# Patient Record
Sex: MALE | Race: WHITE | NOT HISPANIC OR LATINO | Employment: OTHER | URBAN - METROPOLITAN AREA
[De-identification: names, ages, dates, MRNs, and addresses within clinical notes are randomized per-mention and may not be internally consistent; named-entity substitution may affect disease eponyms.]

---

## 2022-12-26 ENCOUNTER — HOSPITAL ENCOUNTER (EMERGENCY)
Facility: CLINIC | Age: 37
Discharge: HOME | End: 2022-12-26
Attending: EMERGENCY MEDICINE | Admitting: EMERGENCY MEDICINE
Payer: COMMERCIAL

## 2022-12-26 VITALS
OXYGEN SATURATION: 96 % | SYSTOLIC BLOOD PRESSURE: 133 MMHG | RESPIRATION RATE: 18 BRPM | BODY MASS INDEX: 25.84 KG/M2 | WEIGHT: 195 LBS | HEIGHT: 73 IN | HEART RATE: 73 BPM | TEMPERATURE: 97.9 F | DIASTOLIC BLOOD PRESSURE: 88 MMHG

## 2022-12-26 DIAGNOSIS — S61.211A LACERATION OF LEFT INDEX FINGER WITHOUT FOREIGN BODY WITHOUT DAMAGE TO NAIL, INITIAL ENCOUNTER: ICD-10-CM

## 2022-12-26 PROCEDURE — 250N000013 HC RX MED GY IP 250 OP 250 PS 637: Performed by: EMERGENCY MEDICINE

## 2022-12-26 PROCEDURE — 99283 EMERGENCY DEPT VISIT LOW MDM: CPT

## 2022-12-26 PROCEDURE — 12001 RPR S/N/AX/GEN/TRNK 2.5CM/<: CPT

## 2022-12-26 RX ORDER — CEPHALEXIN 500 MG/1
1000 CAPSULE ORAL ONCE
Status: COMPLETED | OUTPATIENT
Start: 2022-12-26 | End: 2022-12-26

## 2022-12-26 RX ORDER — CEPHALEXIN 500 MG/1
500 CAPSULE ORAL 3 TIMES DAILY
Qty: 21 CAPSULE | Refills: 0 | Status: SHIPPED | OUTPATIENT
Start: 2022-12-26 | End: 2023-01-02

## 2022-12-26 RX ADMIN — CEPHALEXIN 1000 MG: 500 CAPSULE ORAL at 21:43

## 2022-12-26 ASSESSMENT — ENCOUNTER SYMPTOMS: WOUND: 1

## 2022-12-26 ASSESSMENT — ACTIVITIES OF DAILY LIVING (ADL): ADLS_ACUITY_SCORE: 35

## 2022-12-27 NOTE — ED TRIAGE NOTES
Pt presents to ED for laceration to left index finger     Triage Assessment     Row Name 12/26/22 2028       Triage Assessment (Adult)    Airway WDL WDL       Respiratory WDL    Respiratory WDL WDL       Skin Circulation/Temperature WDL    Skin Circulation/Temperature WDL WDL       Cardiac WDL    Cardiac WDL WDL       Peripheral/Neurovascular WDL    Peripheral Neurovascular WDL WDL       Cognitive/Neuro/Behavioral WDL    Cognitive/Neuro/Behavioral WDL WDL

## 2023-09-18 NOTE — TELEPHONE ENCOUNTER
Action 2023 JTV 1:16 PM    Action Taken CSS called patient. Patient did not . CSS was unable to Lvm for patient to return call.      Action October 3, 2023 Jtv 2:47 PM    Action Taken CSS called patient. Patient did not  and CSS was unable wit LVM for return call. CSS sent a message to Nurse with update that max attempt has been met.     Max attempts to contact patient for medical records reached     MEDICAL RECORDS REQUEST   [facility]  Urology Clinic  [address] [place] [address]  PHONE: [phone]  Fax: [phone]        FUTURE VISIT INFORMATION                                                   Isaiah Coley, : 1985 scheduled for future visit at [facility]    APPOINTMENT INFORMATION:  Date: 10/12/2023  Provider:  Joseph Groves MD  Reason for Visit/Diagnosis: INFERTILITY      RECORDS REQUESTED FOR VISIT                                                     NOTES  STATUS/DETAILS   OPERATIVE REPORT  in process   MEDICATION LIST  in process   INFERTILITY     LAST UROLOGY/OB GYN VISIT NOTE  in process   SEMEN ANALYSIS (LAST 2)  in process   ALBUMIN  in process   FSH  in process   LH  in process   SHBG  in process   T  in process     PRE-VISIT CHECKLIST      Joint diagnostic appointment coordinated correctly          (ensure right order & amount of time) Yes   RECORD COLLECTION COMPLETE If no, please explain pending

## 2023-10-04 ENCOUNTER — TELEPHONE (OUTPATIENT)
Dept: UROLOGY | Facility: CLINIC | Age: 38
End: 2023-10-04
Payer: COMMERCIAL

## 2023-10-04 NOTE — TELEPHONE ENCOUNTER
LVM for pt in regards to appt with Dr. Groves on 10/12/2023. We have been unable to reach pt to pull most recent records into his chart. Left clinic call back number for pt to call.      Lakisha Magallon  October 4, 2023  4:17 PM

## 2023-10-12 ENCOUNTER — OFFICE VISIT (OUTPATIENT)
Dept: UROLOGY | Facility: CLINIC | Age: 38
End: 2023-10-12
Payer: COMMERCIAL

## 2023-10-12 ENCOUNTER — PRE VISIT (OUTPATIENT)
Dept: UROLOGY | Facility: CLINIC | Age: 38
End: 2023-10-12

## 2023-10-12 VITALS
DIASTOLIC BLOOD PRESSURE: 82 MMHG | HEIGHT: 73 IN | SYSTOLIC BLOOD PRESSURE: 124 MMHG | BODY MASS INDEX: 27.96 KG/M2 | WEIGHT: 211 LBS

## 2023-10-12 DIAGNOSIS — R86.8 TERATOSPERMIA: ICD-10-CM

## 2023-10-12 DIAGNOSIS — Z31.41 FERTILITY TESTING: Primary | ICD-10-CM

## 2023-10-12 DIAGNOSIS — R86.8 ASTHENOSPERMIA: ICD-10-CM

## 2023-10-12 PROCEDURE — 99214 OFFICE O/P EST MOD 30 MIN: CPT | Performed by: UROLOGY

## 2023-10-12 ASSESSMENT — PAIN SCALES - GENERAL: PAINLEVEL: NO PAIN (0)

## 2023-10-12 NOTE — LETTER
10/12/2023       RE: Isaiah Coley  [address] [address]     Dear Colleague,    Thank you for referring your patient, Isaiah Coley, to the [facility] [place] [facility]. Please see a copy of my visit note below.    Dear Dr. Ara Leon, it was my pleasure to see Mr. Isaiah Coley, a 37 year old male here in consultation today for fertility evaluation.  His spouse is Elsie Choi age 38 (7/23/85).    This couple has been attempting to conceive for the last 2 yrs. They have one previous pregnancy together, but this was ectopic - 1 yr ago, and she is now down 1 fallopian tube/ovary, he states.  Pregnancies with other partners: she had one elective termination remotely.  They have tried timed intercourse. They have not tried IUI or IVF.    Female factors suspected: advanced female age., solitary ovary secondary to ectopic pregnancy a year ago.   Cycles are regular.    PAST MEDICAL HISTORY:    No chronic medical problems     PAST SURG HISTORY  No history of surgery.     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  No alcohol abuse, very rare tobacco use.   Social History     Tobacco Use    Smoking status: Some Days     Types: Cigarettes    Smokeless tobacco: Never     REVIEW OF SYSTEMS:  Significant for feeling well at present .    Denies erectile dysfunction, ejaculatory problems, testicular pain. No vision or smell deficits, no chronic sinus or respiratory infections. No recent febrile illness, weight loss. No heat or cold intolerance, gynecomastia, or other endocrine complaints.    Otherwise, no constitutional, eye, ENT, heart, lung, GI , musculoskeletal, skin, neurologic, psychiatric, or hematologic complaints.    GONADOTOXIN EXPOSURE: Unremarkable. Otherwise negative for  "marijuana, heat, chemicals, pesticides, heavy metals, steroids, chemotherapy or radiation.    GENERAL PHYSICAL EXAM  /82 (BP Location: Right arm, Patient Position: Sitting, Cuff Size: Adult Regular)   Ht 1.854 m (6' 1\")   Wt 95.7 kg (211 lb)   BMI 27.84 kg/m     Constitutional: No acute distress. Well nourished.   PSYCH: normal mood and affect.  NEURO: normal gait, no focal deficits.   EYES: anicteric, EOMI, PERR  CARDIOPULMONARY: breathing non-labored, pulse regular, no peripheral edema.  GI: Abdomen soft, non-tender, no surgical scars, no organomegaly.  MUSCULOSKELETAL: normal limb proportions, no muscle wasting, no contractures.  SKIN: Normal virilized hair distribution, no lesions, warts or rashes over genitalia, abdomen extremities or face.  HEME/LYMPH: no ecchymosis, no lymphadenopathy in groin, no lymphedema.     EXAM:  Phallus circumcised, meatus adequate, no plaques palpated.   Left testis descended , size is 16-18 cc , consistency is normal . No intra-testicular masses.   Right testis descended , size is 18-20 cc , consistency is normal . No intra-testicular masses.   Epididymes present, non-tender, not enlarged.   Left cord: Vas present.grade III  varicocele noted.  Right cord: Vas present. Grade II varicocele noted.     Rectal exam deferred.     Labs/imaging reviewed by me today:  Semen analysis was previously completed at outside facility-he reports this showed OK counts, but asthenospermia, teratospermia.  I don't have this result to review today.      ASSESSMENT:  Fertility Testing.  Testicular hypofunction - asthenospermia, teratospermia.  Bilateral varicocele noted      PLAN:  Hormonal panel  Repeat SA at [facility] [phone].  Discussed risks, benefits, and alternatives of varicocele repair, including various methods.  I counseled him extensively on the nature of varicoceles, and their possible effects on pain, fertility, and testosterone production.  I " described surgery and embolization approaches, and the detailed risks of surgical repair.  These include damage to artery (ischemia), damage to lymphatics ( hydrocele), as well as risk of recurrence (~1%). Discussed that about 2/3rds of men see improved semen parameters after varicocele repair and that improvement takes at least 3 months to see.  Discussed that varicocele pain typically improves with repair, but in rare cases the testis can become sensitive after a surgical repair.   He will think about surgery, and let me know if he is interested.    Get outside facility semen analysis sent, I will contact him with my interpretation  Discussed advanced female age is a negative predictive factor for fertility chances  Discussed OTC supplements to consider taking  I will contact him with results and plan/options.      Thank-you for allowing me to care for your patient.  Sincerely,    Joseph Groves MD    CC: Dr. Leon        Additional Coding Information:    Problems:  3 -- one acute uncomplicated illness or injury    Data Reviewed  N/A     Tests ordered/pending: 3 lab studies ordered    Level of risk:  3 -- low risk (e.g., OTC medication or observation, minor surgery without risks)    Time spent:  21 minutes spent on the date of the encounter doing chart review, history and exam, documentation and further activities per the note

## 2023-10-12 NOTE — PROGRESS NOTES
"Dear Dr. Ara Leon, it was my pleasure to see Mr. Isaiah Coley, a 37 year old male here in consultation today for fertility evaluation.  His spouse is Elsie Choi age 38 (7/23/85).    This couple has been attempting to conceive for the last 2 yrs. They have one previous pregnancy together, but this was ectopic - 1 yr ago, and she is now down 1 fallopian tube/ovary, he states.  Pregnancies with other partners: she had one elective termination remotely.  They have tried timed intercourse. They have not tried IUI or IVF.    Female factors suspected: advanced female age., solitary ovary secondary to ectopic pregnancy a year ago.   Cycles are regular.    PAST MEDICAL HISTORY:    No chronic medical problems     PAST SURG HISTORY  No history of surgery.     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  No alcohol abuse, very rare tobacco use.   Social History     Tobacco Use     Smoking status: Some Days     Types: Cigarettes     Smokeless tobacco: Never     REVIEW OF SYSTEMS:  Significant for feeling well at present .    Denies erectile dysfunction, ejaculatory problems, testicular pain. No vision or smell deficits, no chronic sinus or respiratory infections. No recent febrile illness, weight loss. No heat or cold intolerance, gynecomastia, or other endocrine complaints.    Otherwise, no constitutional, eye, ENT, heart, lung, GI , musculoskeletal, skin, neurologic, psychiatric, or hematologic complaints.    GONADOTOXIN EXPOSURE: Unremarkable. Otherwise negative for marijuana, heat, chemicals, pesticides, heavy metals, steroids, chemotherapy or radiation.    GENERAL PHYSICAL EXAM  /82 (BP Location: Right arm, Patient Position: Sitting, Cuff Size: Adult Regular)   Ht 1.854 m (6' 1\")   Wt 95.7 kg (211 lb)   BMI 27.84 kg/m     Constitutional: No acute distress. Well nourished. "   PSYCH: normal mood and affect.  NEURO: normal gait, no focal deficits.   EYES: anicteric, EOMI, PERR  CARDIOPULMONARY: breathing non-labored, pulse regular, no peripheral edema.  GI: Abdomen soft, non-tender, no surgical scars, no organomegaly.  MUSCULOSKELETAL: normal limb proportions, no muscle wasting, no contractures.  SKIN: Normal virilized hair distribution, no lesions, warts or rashes over genitalia, abdomen extremities or face.  HEME/LYMPH: no ecchymosis, no lymphadenopathy in groin, no lymphedema.     EXAM:  Phallus circumcised, meatus adequate, no plaques palpated.   Left testis descended , size is 16-18 cc , consistency is normal . No intra-testicular masses.   Right testis descended , size is 18-20 cc , consistency is normal . No intra-testicular masses.   Epididymes present, non-tender, not enlarged.   Left cord: Vas present.grade III  varicocele noted.  Right cord: Vas present. Grade II varicocele noted.     Rectal exam deferred.     Labs/imaging reviewed by me today:  Semen analysis was previously completed at outside facility-he reports this showed OK counts, but asthenospermia, teratospermia.  I don't have this result to review today.      ASSESSMENT:    Fertility Testing.    Testicular hypofunction - asthenospermia, teratospermia.    Bilateral varicocele noted      PLAN:    Hormonal panel    Repeat SA at [facility] [phone].    Discussed risks, benefits, and alternatives of varicocele repair, including various methods.  I counseled him extensively on the nature of varicoceles, and their possible effects on pain, fertility, and testosterone production.  I described surgery and embolization approaches, and the detailed risks of surgical repair.  These include damage to artery (ischemia), damage to lymphatics ( hydrocele), as well as risk of recurrence (~1%). Discussed that about 2/3rds of men see improved semen parameters after varicocele repair and that improvement  takes at least 3 months to see.  Discussed that varicocele pain typically improves with repair, but in rare cases the testis can become sensitive after a surgical repair.     He will think about surgery, and let me know if he is interested.      Get outside facility semen analysis sent, I will contact him with my interpretation    Discussed advanced female age is a negative predictive factor for fertility chances    Discussed OTC supplements to consider taking    I will contact him with results and plan/options.      Thank-you for allowing me to care for your patient.  Sincerely,    Joseph Groves MD    CC: Dr. Leon        Additional Coding Information:    Problems:  3 -- one acute uncomplicated illness or injury    Data Reviewed  N/A     Tests ordered/pending: 3 lab studies ordered    Level of risk:  3 -- low risk (e.g., OTC medication or observation, minor surgery without risks)    Time spent:  21 minutes spent on the date of the encounter doing chart review, history and exam, documentation and further activities per the note

## 2023-10-12 NOTE — PATIENT INSTRUCTIONS
You were seen in the urology clinic today by Dr. Groves.  The following has been recommended for you:    Schedule an appointment with our lab to have your blood drawn as soon as you are able.  The number to schedule lab work is [phone].  Schedule an appointment with our diagnostic andrology lab. A handout has been provided for you.   Please reach out to the healthcare provider that processed your last semen analysis results and have those records sent to our clinic for Dr. Groves's review.    Thank you.    Flores Bernstein LPN

## 2023-10-17 DIAGNOSIS — I86.1 BILATERAL VARICOCELES: Primary | ICD-10-CM

## 2023-10-17 DIAGNOSIS — E29.1 TESTICULAR HYPOFUNCTION: ICD-10-CM

## 2023-10-17 RX ORDER — CEFAZOLIN SODIUM 2 G/50ML
2 SOLUTION INTRAVENOUS SEE ADMIN INSTRUCTIONS
Status: CANCELLED | OUTPATIENT
Start: 2023-10-17

## 2023-10-17 RX ORDER — CEFAZOLIN SODIUM 2 G/50ML
2 SOLUTION INTRAVENOUS
Status: CANCELLED | OUTPATIENT
Start: 2023-10-17

## 2023-10-23 ENCOUNTER — LAB (OUTPATIENT)
Dept: LAB | Facility: CLINIC | Age: 38
End: 2023-10-23
Payer: COMMERCIAL

## 2023-10-23 ENCOUNTER — TELEPHONE (OUTPATIENT)
Dept: UROLOGY | Facility: CLINIC | Age: 38
End: 2023-10-23

## 2023-10-23 DIAGNOSIS — R86.8 TERATOSPERMIA: ICD-10-CM

## 2023-10-23 DIAGNOSIS — Z31.41 FERTILITY TESTING: ICD-10-CM

## 2023-10-23 DIAGNOSIS — R86.8 ASTHENOSPERMIA: ICD-10-CM

## 2023-10-23 LAB
FSH SERPL IRP2-ACNC: 11.2 MIU/ML (ref 1.5–12.4)
SHBG SERPL-SCNC: 25 NMOL/L (ref 11–80)

## 2023-10-23 PROCEDURE — 84403 ASSAY OF TOTAL TESTOSTERONE: CPT | Performed by: UROLOGY

## 2023-10-23 PROCEDURE — 84270 ASSAY OF SEX HORMONE GLOBUL: CPT | Performed by: UROLOGY

## 2023-10-23 PROCEDURE — 36415 COLL VENOUS BLD VENIPUNCTURE: CPT | Performed by: PATHOLOGY

## 2023-10-23 PROCEDURE — 99000 SPECIMEN HANDLING OFFICE-LAB: CPT | Performed by: PATHOLOGY

## 2023-10-23 PROCEDURE — 83001 ASSAY OF GONADOTROPIN (FSH): CPT | Performed by: UROLOGY

## 2023-10-23 NOTE — TELEPHONE ENCOUNTER
Called and LVM for patient to schedule surgery with Dr. Groves. Provided direct call back number [phone].      Syl Johnson on 10/23/23 at 2:33 PM.  Senior Perioperative Coordinator   Ph: [phone]

## 2023-10-24 ENCOUNTER — LAB (OUTPATIENT)
Dept: LAB | Facility: CLINIC | Age: 38
End: 2023-10-24
Payer: COMMERCIAL

## 2023-10-24 DIAGNOSIS — Z31.41 FERTILITY TESTING: ICD-10-CM

## 2023-10-24 DIAGNOSIS — R86.8 ASTHENOSPERMIA: ICD-10-CM

## 2023-10-24 DIAGNOSIS — R86.8 TERATOSPERMIA: ICD-10-CM

## 2023-10-24 PROCEDURE — 89322 SEMEN ANAL STRICT CRITERIA: CPT

## 2023-10-25 LAB
ABNORMAL SPERM MORPHOLOGY: 96
ABSTINENCE DAYS: 3 DAYS (ref 2–7)
AGGLUTINATION: NO
ANALYSIS TEMP - CENTIGRADE: 23 CENTIGRADE
COLLECTION METHOD: NORMAL
COLLECTION SITE: NORMAL
CONSENT TO RELEASE TO PARTNER: YES
DAL- RECEIVED TIME: NORMAL
HEAD DEFECT: 96 %
IMMOTILE: 31 %
LIQUEFIED: YES
MIDPIECE DEFECT: 52 %
NON-PROGRESSIVE MOTILITY: 8 %
NORMAL SPERM MORPHOLOGY: 4 % NORMAL FORMS
PROGRESSIVE MOTILITY: 61 %
ROUND CELLS: 0.2 MILLION/ML
SPECIMEN PH: 7.2 PH
SPECIMEN VOLUME: 2.2 ML
SPERM CONCENTRATION: 53.2 MILLION/ML
TAIL DEFECT: 5 %
TESTOST FREE SERPL-MCNC: 5.71 NG/DL
TESTOST SERPL-MCNC: 253 NG/DL (ref 240–950)
TIME OF ANALYSIS: NORMAL
TOTAL PROGRESSIVE MOTILE NUMBER: 71 MILLION
TOTAL SPERM NUMBER: 117 MILLION
VISCOUS: NO
VITALITY: NORMAL

## 2023-10-26 NOTE — RESULT ENCOUNTER NOTE
Dear Isaiah,     Here are your recent results.     Testosterone results were low normal.  Oftentimes testosterone will improve after varicocele repair.    FSH is the signal from the brain to the testicles to drive sperm production.  Your FSH level is clinically elevated; I prefer to see this under 7 or so.  Higher FSH typically indicates decreased sperm production ability in the testes, and the brain raises the FSH to try to drive more sperm production.  Varicoceles are the most likely cause of this.    Please let us know if you have any questions or concerns.     Laura ESTES

## 2023-10-26 NOTE — RESULT ENCOUNTER NOTE
Dear Isaiah     Here are your recent test results which are NORMAL.    SA results are normal on every measure.  Counts are adequate for insemination or IVF with a female fertility specialist.  I still recommend varicocele repair as a good next step from the male side.    Thank You,    Please let me know if you have any questions!    Laura ESTES

## 2023-10-27 ENCOUNTER — TELEPHONE (OUTPATIENT)
Dept: UROLOGY | Facility: CLINIC | Age: 38
End: 2023-10-27
Payer: COMMERCIAL

## 2023-10-27 NOTE — TELEPHONE ENCOUNTER
Voicemail message received from the patient that he is asking for a return call to get his surgery scheduled with Dr. Groves. He also stated that he has been waiting for someone on Dr. Groves's team to call him back about questions related to the surgery. He is asking for a return call on his cell phone: [phone].    Aydee Wright  Surgery Scheduling Coordinator  Ph: [phone]

## 2023-10-27 NOTE — TELEPHONE ENCOUNTER
[facility] Call Center    Phone Message    May a detailed message be left on voicemail: yes     Reason for Call: Other: Patient is returning call for below, attempted to call number below without luck. He would also like to ask the provider some questions prior to scheduling.      Action Taken: Message routed to:  [facility] uro    Travel Screening: Not Applicable

## 2023-10-30 ENCOUNTER — HOSPITAL ENCOUNTER (OUTPATIENT)
Facility: AMBULATORY SURGERY CENTER | Age: 38
End: 2023-10-30
Attending: UROLOGY
Payer: COMMERCIAL

## 2023-10-30 PROBLEM — I86.1 BILATERAL VARICOCELES: Status: ACTIVE | Noted: 2023-10-17

## 2023-10-30 PROBLEM — E29.1 TESTICULAR HYPOFUNCTION: Status: ACTIVE | Noted: 2023-10-17

## 2023-10-30 NOTE — TELEPHONE ENCOUNTER
Patient is scheduled for a surgical procedure with Dr. Groves      Spoke with: Patient via phone      Date of Surgery/Procedure: Tuesday January 23, 2024    Location: [facility] OR      Informed patient they will need an adult : Yes     Pre-op: Yes     NP EVAL: Tuesday January 09, 2024    Additional imaging/appointments:     Post-op: Thursday May 30, 2024     Additional comments:      Surgery packet: MyChart     Patient is aware that surgery time is tentative to change and to expect a call 3-1 business days from Pre Admission Nursing for instructions and arrival time

## 2023-10-30 NOTE — TELEPHONE ENCOUNTER
Called patient to schedule surgery with Dr. Groves. No answer, callback number [phone] left on  for patient.     Aline Viera on 10/30/2023 at 9:57 AM

## 2023-11-12 ENCOUNTER — HEALTH MAINTENANCE LETTER (OUTPATIENT)
Age: 38
End: 2023-11-12

## 2024-12-28 ENCOUNTER — HEALTH MAINTENANCE LETTER (OUTPATIENT)
Age: 39
End: 2024-12-28